# Patient Record
Sex: FEMALE | Race: BLACK OR AFRICAN AMERICAN | Employment: UNEMPLOYED
[De-identification: names, ages, dates, MRNs, and addresses within clinical notes are randomized per-mention and may not be internally consistent; named-entity substitution may affect disease eponyms.]

---

## 2020-09-04 ENCOUNTER — NURSE TRIAGE (OUTPATIENT)
Dept: OTHER | Facility: CLINIC | Age: 20
End: 2020-09-04

## 2020-09-04 NOTE — TELEPHONE ENCOUNTER
Reason for Disposition   Nipple discharge and not bloody (e.g., clear, white, yellow, brown, green)    Answer Assessment - Initial Assessment Questions  1. SYMPTOM: \"What's the main symptom you're concerned about? \"  (e.g., lump, pain, rash, nipple discharge)      Clear discharge and pain in both breast  2. LOCATION: \"Where is the pain located? \"      5/10 breast pain  3. ONSET: \"When did pain and discharge  start? \"      3-4 days ago  4. PRIOR HISTORY: \"Do you have any history of prior problems with your breasts? \" (e.g., lumps, cancer, fibrocystic breast disease)      no  5. CAUSE: \"What do you think is causing this symptom? \"      no  6. OTHER SYMPTOMS: \"Do you have any other symptoms? \" (e.g., fever, breast pain, redness or rash, nipple discharge)      Breast pain and clear nipple discharge  7. PREGNANCY-BREASTFEEDING: \"Is there any chance you are pregnant? \" \"When was your last menstrual period? \" \"Are you breastfeeding? \"      No    Protocols used: BREAST SYMPTOMS-ADULT-[place]    Caller provided care advice and instructed to call back with worsening symptoms. Spoke with [facility], [facility] in [facility] to schedule appointment.